# Patient Record
Sex: MALE | Race: BLACK OR AFRICAN AMERICAN | NOT HISPANIC OR LATINO | Employment: OTHER | URBAN - NONMETROPOLITAN AREA
[De-identification: names, ages, dates, MRNs, and addresses within clinical notes are randomized per-mention and may not be internally consistent; named-entity substitution may affect disease eponyms.]

---

## 2024-02-09 PROBLEM — Z95.0 PACEMAKER: Status: ACTIVE | Noted: 2024-02-09

## 2024-02-09 PROBLEM — I27.20 PULMONARY HYPERTENSION (MULTI): Status: ACTIVE | Noted: 2024-02-09

## 2024-02-09 PROBLEM — I10 HYPERTENSION: Status: ACTIVE | Noted: 2024-02-09

## 2024-02-09 PROBLEM — D86.85 CARDIAC SARCOIDOSIS (HHS-HCC): Status: ACTIVE | Noted: 2024-02-09

## 2024-02-09 PROBLEM — I49.5 SICK SINUS SYNDROME (MULTI): Status: ACTIVE | Noted: 2024-02-09

## 2024-02-09 PROBLEM — D86.9 SARCOIDOSIS: Status: ACTIVE | Noted: 2024-02-09

## 2024-02-09 PROBLEM — I48.20 CHRONIC ATRIAL FIBRILLATION (MULTI): Status: ACTIVE | Noted: 2024-02-09

## 2024-02-09 PROBLEM — R06.02 SHORTNESS OF BREATH: Status: ACTIVE | Noted: 2024-02-09

## 2024-02-09 RX ORDER — LISINOPRIL 20 MG/1
1 TABLET ORAL DAILY
COMMUNITY
Start: 2021-10-03

## 2024-02-09 RX ORDER — APIXABAN 5 MG/1
5 TABLET, FILM COATED ORAL 2 TIMES DAILY
COMMUNITY
Start: 2022-01-01

## 2024-02-09 RX ORDER — METOPROLOL SUCCINATE 50 MG/1
1 TABLET, EXTENDED RELEASE ORAL DAILY
COMMUNITY
Start: 2021-01-08

## 2024-02-09 RX ORDER — HYDROCHLOROTHIAZIDE 25 MG/1
1 TABLET ORAL DAILY
COMMUNITY
Start: 2021-12-07

## 2024-04-23 PROCEDURE — 93294 REM INTERROG EVL PM/LDLS PM: CPT | Performed by: INTERNAL MEDICINE

## 2024-05-08 ENCOUNTER — OFFICE VISIT (OUTPATIENT)
Dept: CARDIOLOGY | Facility: CLINIC | Age: 74
End: 2024-05-08
Payer: MEDICARE

## 2024-05-08 VITALS
HEIGHT: 71 IN | WEIGHT: 190 LBS | HEART RATE: 62 BPM | DIASTOLIC BLOOD PRESSURE: 72 MMHG | SYSTOLIC BLOOD PRESSURE: 130 MMHG | BODY MASS INDEX: 26.6 KG/M2

## 2024-05-08 DIAGNOSIS — Z95.0 PACEMAKER: ICD-10-CM

## 2024-05-08 DIAGNOSIS — D86.85 CARDIAC SARCOIDOSIS (HHS-HCC): ICD-10-CM

## 2024-05-08 DIAGNOSIS — I10 PRIMARY HYPERTENSION: ICD-10-CM

## 2024-05-08 DIAGNOSIS — Z78.9 NEVER SMOKED TOBACCO: ICD-10-CM

## 2024-05-08 DIAGNOSIS — I27.20 PULMONARY HYPERTENSION (MULTI): ICD-10-CM

## 2024-05-08 DIAGNOSIS — I48.20 CHRONIC ATRIAL FIBRILLATION (MULTI): Primary | ICD-10-CM

## 2024-05-08 DIAGNOSIS — I49.5 SICK SINUS SYNDROME (MULTI): ICD-10-CM

## 2024-05-08 DIAGNOSIS — R06.02 SHORTNESS OF BREATH: ICD-10-CM

## 2024-05-08 DIAGNOSIS — D86.9 SARCOIDOSIS: ICD-10-CM

## 2024-05-08 PROCEDURE — 3075F SYST BP GE 130 - 139MM HG: CPT | Performed by: INTERNAL MEDICINE

## 2024-05-08 PROCEDURE — 3078F DIAST BP <80 MM HG: CPT | Performed by: INTERNAL MEDICINE

## 2024-05-08 PROCEDURE — 1159F MED LIST DOCD IN RCRD: CPT | Performed by: INTERNAL MEDICINE

## 2024-05-08 PROCEDURE — 1160F RVW MEDS BY RX/DR IN RCRD: CPT | Performed by: INTERNAL MEDICINE

## 2024-05-08 PROCEDURE — 99214 OFFICE O/P EST MOD 30 MIN: CPT | Performed by: INTERNAL MEDICINE

## 2024-05-08 PROCEDURE — 1036F TOBACCO NON-USER: CPT | Performed by: INTERNAL MEDICINE

## 2024-05-08 ASSESSMENT — ENCOUNTER SYMPTOMS: SHORTNESS OF BREATH: 1

## 2024-05-08 NOTE — PATIENT INSTRUCTIONS
Please bring all medicines, vitamins, and herbal supplements with you when you come to the office.    Prescriptions will not be filled unless you are compliant with your follow up appointments or have a follow up appointment scheduled as per instruction of your physician. Refills should be requested at the time of your visit.     BMI was above normal measurement. Current weight: 86.2 kg (190 lb)  Weight change since last visit (-) denotes wt loss 10 lbs   Weight loss needed to achieve BMI 25: 11.1 Lbs  Weight loss needed to achieve BMI 30: -24.6 Lbs  Provided instructions on dietary changes  Advised to Increase physical activity.

## 2024-05-08 NOTE — LETTER
May 8, 2024     Mustapha Graves DO  [address] [facility]  [place] [address]    Patient: Miguel Garces   YOB: 1950   Date of Visit: 5/8/2024       Dear Dr. Mustapha Graves, :    Thank you for referring Miguel Garces to me for evaluation. Below are my notes for this consultation.  If you have questions, please do not hesitate to call me. I look forward to following your patient along with you.       Sincerely,     Olena Schulz MD      CC: No Recipients  ______________________________________________________________________________________    Subjective   Miguel Garces is a 73 y.o. male       Chief Complaint    Follow-up          HPI   Patient is here for follow-up continue management for chronic permanent atrial fibrillation, sick sinus syndrome with permanent pacemaker implantation.  We suspect that he may have a degree of cardiac sarcoidosis based on the development of atrial fibrillation and bradycardia arrhythmia.  He underwent pacemaker implantation.  He has not had any ventricular arrhythmia.  He denies any chest pain, palpitation, lightheadedness, dizziness or syncope.  Recent lab was done through his PCP    ASSESSMENT:      1. chronic atrial fibrillation, with sick sinus syndrome. On long-term anticoagulation tolerating that well  2. Minor symptoms of shortness of breath related to pulmonary sarcoidosis. He had a previous echo and pulmonary function test several years ago.  He will follow-up with the pulmonary service  3. Sick sinus syndrome with permanent pacemaker implantation. With good pacemaker function  4. The patient had significant bradycardia arrhythmia and based on that he is suspected to have possibly a cardiac sarcoidosis due to the development of profound bradycardia and atrial fibrillation. No ventricular tachyarrhythmia were noted.  5. Long-term anticoagulation with Eliquis.  6. Hypertension, controlled  7. Mildly  "overweight  8. Pulmonary hypertension due to lung disease     RECOMMENDATION:      1. The patient was advised to continue present medical therapy  2. We discussed workup for cardiac sarcoidosis. Unfortunately, the patient have a pacemaker and he may not be a good candidate for MRI. At this point of time, considering lack of ventricular tachyarrhythmia and the ability to monitor for that through his device, we elected to continue with observant approach.  3. The rationale behind long-term anticoagulation reviewed with him at length. He understood and agreed.  4. Patient was counseled regarding losing weight, exercise and dietary modification   5.  I reviewed his previous labs and device check  6. I will see him n 9 months and follow-up  Review of Systems   Respiratory:  Positive for shortness of breath.    All other systems reviewed and are negative.           Vitals:    05/08/24 1343   BP: 130/72   BP Location: Right arm   Patient Position: Sitting   Pulse: 62   Weight: 86.2 kg (190 lb)   Height: 1.803 m (5' 11\")        Objective   Physical Exam  Constitutional:       Appearance: Normal appearance.   HENT:      Nose: Nose normal.   Neck:      Vascular: No carotid bruit.   Cardiovascular:      Rate and Rhythm: Normal rate.      Pulses: Normal pulses.      Heart sounds: Normal heart sounds.   Pulmonary:      Effort: Pulmonary effort is normal.   Abdominal:      General: Bowel sounds are normal.      Palpations: Abdomen is soft.   Musculoskeletal:         General: Normal range of motion.      Cervical back: Normal range of motion.      Right lower leg: No edema.      Left lower leg: No edema.   Skin:     General: Skin is warm and dry.   Neurological:      General: No focal deficit present.      Mental Status: He is alert.   Psychiatric:         Mood and Affect: Mood normal.         Behavior: Behavior normal.         Thought Content: Thought content normal.         Judgment: Judgment normal. "         Allergies  Spironolactone     Current Medications    Current Outpatient Medications:   •  Eliquis 5 mg tablet, Take 1 tablet (5 mg) by mouth 2 times a day., Disp: , Rfl:   •  hydroCHLOROthiazide (HYDRODiuril) 25 mg tablet, Take 1 tablet (25 mg) by mouth once daily., Disp: , Rfl:   •  lisinopril 20 mg tablet, Take 1 tablet (20 mg) by mouth once daily., Disp: , Rfl:   •  metoprolol succinate XL (Toprol-XL) 50 mg 24 hr tablet, Take 1 tablet (50 mg) by mouth once daily., Disp: , Rfl:                      Assessment/Plan   1. Chronic atrial fibrillation (Multi)  Follow Up In Cardiology      2. Cardiac sarcoidosis ([facility])        3. Primary hypertension        4. Pacemaker        5. Sick sinus syndrome (Multi)        6. Pulmonary hypertension (Multi)        7. Shortness of breath        8. Sarcoidosis        9. Never smoked tobacco                 Scribe Attestation  By signing my name below, Lauren LOPEZ LPN, Scribe   attest that this documentation has been prepared under the direction and in the presence of Olena Schulz MD.     Provider Attestation - Scribe documentation    All medical record entries made by the Scribe were at my direction and personally dictated by me. I have reviewed the chart and agree that the record accurately reflects my personal performance of the history, physical exam, discussion and plan.

## 2024-05-08 NOTE — PROGRESS NOTES
Subjective   Miguel Garces is a 73 y.o. male       Chief Complaint    Follow-up          HPI   Patient is here for follow-up continue management for chronic permanent atrial fibrillation, sick sinus syndrome with permanent pacemaker implantation.  We suspect that he may have a degree of cardiac sarcoidosis based on the development of atrial fibrillation and bradycardia arrhythmia.  He underwent pacemaker implantation.  He has not had any ventricular arrhythmia.  He denies any chest pain, palpitation, lightheadedness, dizziness or syncope.  Recent lab was done through his PCP    ASSESSMENT:      1. chronic atrial fibrillation, with sick sinus syndrome. On long-term anticoagulation tolerating that well  2. Minor symptoms of shortness of breath related to pulmonary sarcoidosis. He had a previous echo and pulmonary function test several years ago.  He will follow-up with the pulmonary service  3. Sick sinus syndrome with permanent pacemaker implantation. With good pacemaker function  4. The patient had significant bradycardia arrhythmia and based on that he is suspected to have possibly a cardiac sarcoidosis due to the development of profound bradycardia and atrial fibrillation. No ventricular tachyarrhythmia were noted.  5. Long-term anticoagulation with Eliquis.  6. Hypertension, controlled  7. Mildly overweight  8. Pulmonary hypertension due to lung disease     RECOMMENDATION:      1. The patient was advised to continue present medical therapy  2. We discussed workup for cardiac sarcoidosis. Unfortunately, the patient have a pacemaker and he may not be a good candidate for MRI. At this point of time, considering lack of ventricular tachyarrhythmia and the ability to monitor for that through his device, we elected to continue with observant approach.  3. The rationale behind long-term anticoagulation reviewed with him at length. He understood and agreed.  4. Patient was counseled regarding losing weight, exercise  "and dietary modification   5.  I reviewed his previous labs and device check  6. I will see him n 9 months and follow-up  Review of Systems   Respiratory:  Positive for shortness of breath.    All other systems reviewed and are negative.           Vitals:    05/08/24 1343   BP: 130/72   BP Location: Right arm   Patient Position: Sitting   Pulse: 62   Weight: 86.2 kg (190 lb)   Height: 1.803 m (5' 11\")        Objective   Physical Exam  Constitutional:       Appearance: Normal appearance.   HENT:      Nose: Nose normal.   Neck:      Vascular: No carotid bruit.   Cardiovascular:      Rate and Rhythm: Normal rate.      Pulses: Normal pulses.      Heart sounds: Normal heart sounds.   Pulmonary:      Effort: Pulmonary effort is normal.   Abdominal:      General: Bowel sounds are normal.      Palpations: Abdomen is soft.   Musculoskeletal:         General: Normal range of motion.      Cervical back: Normal range of motion.      Right lower leg: No edema.      Left lower leg: No edema.   Skin:     General: Skin is warm and dry.   Neurological:      General: No focal deficit present.      Mental Status: He is alert.   Psychiatric:         Mood and Affect: Mood normal.         Behavior: Behavior normal.         Thought Content: Thought content normal.         Judgment: Judgment normal.         Allergies  Spironolactone     Current Medications    Current Outpatient Medications:     Eliquis 5 mg tablet, Take 1 tablet (5 mg) by mouth 2 times a day., Disp: , Rfl:     hydroCHLOROthiazide (HYDRODiuril) 25 mg tablet, Take 1 tablet (25 mg) by mouth once daily., Disp: , Rfl:     lisinopril 20 mg tablet, Take 1 tablet (20 mg) by mouth once daily., Disp: , Rfl:     metoprolol succinate XL (Toprol-XL) 50 mg 24 hr tablet, Take 1 tablet (50 mg) by mouth once daily., Disp: , Rfl:                      Assessment/Plan   1. Chronic atrial fibrillation (Multi)  Follow Up In Cardiology      2. Cardiac sarcoidosis ([facility]-HCC)        3. Primary " hypertension        4. Pacemaker        5. Sick sinus syndrome (Multi)        6. Pulmonary hypertension (Multi)        7. Shortness of breath        8. Sarcoidosis        9. Never smoked tobacco                 Scribe Attestation  By signing my name below, Lauren LOPEZ LPN, Scribe   attest that this documentation has been prepared under the direction and in the presence of Olena Schulz MD.     Provider Attestation - Scribe documentation    All medical record entries made by the Scribe were at my direction and personally dictated by me. I have reviewed the chart and agree that the record accurately reflects my personal performance of the history, physical exam, discussion and plan.

## 2024-09-04 ENCOUNTER — TELEPHONE (OUTPATIENT)
Dept: CARDIOLOGY | Facility: CLINIC | Age: 74
End: 2024-09-04
Payer: MEDICARE

## 2024-09-04 DIAGNOSIS — I48.92 ATRIAL FIBRILLATION AND FLUTTER (MULTI): ICD-10-CM

## 2024-09-04 DIAGNOSIS — Z95.0 PACEMAKER: ICD-10-CM

## 2024-09-04 DIAGNOSIS — I48.91 ATRIAL FIBRILLATION AND FLUTTER (MULTI): ICD-10-CM

## 2024-09-04 DIAGNOSIS — I49.5 SICK SINUS SYNDROME (MULTI): ICD-10-CM

## 2024-11-29 DIAGNOSIS — I10 PRIMARY HYPERTENSION: ICD-10-CM

## 2024-11-29 RX ORDER — HYDROCHLOROTHIAZIDE 25 MG/1
25 TABLET ORAL DAILY
Qty: 90 TABLET | Refills: 3 | Status: SHIPPED | OUTPATIENT
Start: 2024-11-29 | End: 2025-11-29

## 2025-02-11 ENCOUNTER — APPOINTMENT (OUTPATIENT)
Dept: CARDIOLOGY | Facility: CLINIC | Age: 75
End: 2025-02-11
Payer: MEDICARE

## 2025-02-11 VITALS
WEIGHT: 198 LBS | BODY MASS INDEX: 27.72 KG/M2 | SYSTOLIC BLOOD PRESSURE: 138 MMHG | HEIGHT: 71 IN | HEART RATE: 72 BPM | DIASTOLIC BLOOD PRESSURE: 72 MMHG

## 2025-02-11 DIAGNOSIS — D86.85 CARDIAC SARCOIDOSIS: ICD-10-CM

## 2025-02-11 DIAGNOSIS — R06.02 SHORTNESS OF BREATH: ICD-10-CM

## 2025-02-11 DIAGNOSIS — D86.9 SARCOIDOSIS: ICD-10-CM

## 2025-02-11 DIAGNOSIS — Z95.0 PACEMAKER: ICD-10-CM

## 2025-02-11 DIAGNOSIS — Z78.9 NEVER SMOKED TOBACCO: ICD-10-CM

## 2025-02-11 DIAGNOSIS — I49.5 SICK SINUS SYNDROME (MULTI): ICD-10-CM

## 2025-02-11 DIAGNOSIS — I48.20 CHRONIC ATRIAL FIBRILLATION (MULTI): Primary | ICD-10-CM

## 2025-02-11 DIAGNOSIS — I27.20 PULMONARY HYPERTENSION (MULTI): ICD-10-CM

## 2025-02-11 PROCEDURE — 1159F MED LIST DOCD IN RCRD: CPT | Performed by: INTERNAL MEDICINE

## 2025-02-11 PROCEDURE — 99214 OFFICE O/P EST MOD 30 MIN: CPT | Performed by: INTERNAL MEDICINE

## 2025-02-11 PROCEDURE — G2211 COMPLEX E/M VISIT ADD ON: HCPCS | Performed by: INTERNAL MEDICINE

## 2025-02-11 PROCEDURE — 1036F TOBACCO NON-USER: CPT | Performed by: INTERNAL MEDICINE

## 2025-02-11 PROCEDURE — 3008F BODY MASS INDEX DOCD: CPT | Performed by: INTERNAL MEDICINE

## 2025-02-11 PROCEDURE — 3078F DIAST BP <80 MM HG: CPT | Performed by: INTERNAL MEDICINE

## 2025-02-11 PROCEDURE — 1160F RVW MEDS BY RX/DR IN RCRD: CPT | Performed by: INTERNAL MEDICINE

## 2025-02-11 PROCEDURE — 3075F SYST BP GE 130 - 139MM HG: CPT | Performed by: INTERNAL MEDICINE

## 2025-02-11 ASSESSMENT — ENCOUNTER SYMPTOMS: SHORTNESS OF BREATH: 1

## 2025-02-11 NOTE — LETTER
February 11, 2025     Mustapha Graves DO  [address] [facility]  [place] [address]    Patient: Miguel Garces   YOB: 1950   Date of Visit: 2/11/2025       Dear Dr. Mustapha Graves, :    Thank you for referring Miguel Garces to me for evaluation. Below are my notes for this consultation.  If you have questions, please do not hesitate to call me. I look forward to following your patient along with you.       Sincerely,     Olena Schulz MD      CC: No Recipients  ______________________________________________________________________________________    Subjective   Miguel Garces is a 74 y.o. male       Chief Complaint    Follow-up          HPI        Patient is here for follow-up and management of chronic atrial fibrillation, with sick sinus syndrome, hypertension and pulmonary hypertension due to lung disease.  Since last time I saw him he described functional class II shortness of breath.  He denies chest pain, palpitation, lightheadedness, dizziness or syncope.  His recent device check and lab work all noted and reviewed with him.    ASSESSMENT:      1. chronic atrial fibrillation, with component of sick sinus syndrome and AV joslyn disease . On long-term anticoagulation tolerating that well  2. Minor symptoms of shortness of breath related to pulmonary sarcoidosis. He had a previous echo and pulmonary function test several years ago.  He will follow-up with the pulmonary service  3. Sick sinus syndrome with permanent pacemaker implantation. With good pacemaker function  4. The patient had significant bradycardia arrhythmia and based on that he is suspected to have possibly a cardiac sarcoidosis due to the development of profound bradycardia and atrial fibrillation. No ventricular tachyarrhythmia were noted.  5. Long-term anticoagulation with Eliquis.  6. Hypertension, controlled  7. Mildly overweight with BMI of 27  8.  Moderate pulmonary hypertension  "due to lung disease/sarcoidosis     RECOMMENDATION:      1. The patient was advised to continue present medical therapy  2. We discussed workup for cardiac sarcoidosis. Unfortunately, the patient have a pacemaker and he may not be a good candidate for MRI. At this point of time, considering lack of ventricular tachyarrhythmia and the ability to monitor for that through his device, we elected to continue with observant approach.  3. The rationale behind long-term anticoagulation reviewed with him at length. He understood and agreed.  4. Patient was counseled regarding losing weight, exercise and dietary modification   5.  I reviewed his previous labs and device check  6. I will see him n 9 months and follow-up  Review of Systems   Respiratory:  Positive for shortness of breath.    All other systems reviewed and are negative.           Vitals:    02/11/25 1403   BP: 138/72   BP Location: Left arm   Patient Position: Sitting   Pulse: 72   Weight: 89.8 kg (198 lb)   Height: 1.803 m (5' 11\")        Objective   Physical Exam  Constitutional:       Appearance: Normal appearance.   HENT:      Nose: Nose normal.   Neck:      Vascular: No carotid bruit.   Cardiovascular:      Rate and Rhythm: Normal rate.      Pulses: Normal pulses.      Heart sounds: Normal heart sounds.   Pulmonary:      Effort: Pulmonary effort is normal.   Abdominal:      General: Bowel sounds are normal.      Palpations: Abdomen is soft.   Musculoskeletal:         General: Normal range of motion.      Cervical back: Normal range of motion.      Right lower leg: No edema.      Left lower leg: No edema.   Skin:     General: Skin is warm and dry.   Neurological:      General: No focal deficit present.      Mental Status: He is alert.   Psychiatric:         Mood and Affect: Mood normal.         Behavior: Behavior normal.         Thought Content: Thought content normal.         Judgment: Judgment normal.         Allergies  Spironolactone     Current " Medications    Current Outpatient Medications:   •  Eliquis 5 mg tablet, Take 1 tablet (5 mg) by mouth 2 times a day., Disp: , Rfl:   •  hydroCHLOROthiazide (HYDRODiuril) 25 mg tablet, Take 1 tablet (25 mg) by mouth once daily., Disp: 90 tablet, Rfl: 3  •  lisinopril 20 mg tablet, Take 1 tablet (20 mg) by mouth once daily., Disp: , Rfl:   •  metoprolol succinate XL (Toprol-XL) 50 mg 24 hr tablet, Take 1 tablet (50 mg) by mouth once daily., Disp: , Rfl:                      Assessment/Plan   1. Chronic atrial fibrillation (Multi)  Follow Up In Cardiology      2. Sick sinus syndrome (Multi)  Follow Up In Cardiology      3. Pulmonary hypertension (Multi)        4. Pacemaker        5. Cardiac sarcoidosis        6. BMI 26.0-26.9,adult        7. Sarcoidosis        8. Shortness of breath        9. Never smoked tobacco                 Scribe Attestation  By signing my name below, Lauren LOPEZ LPN, Scribe   attest that this documentation has been prepared under the direction and in the presence of Olena Schulz MD.     Provider Attestation - Scribe documentation    All medical record entries made by the Scribe were at my direction and personally dictated by me. I have reviewed the chart and agree that the record accurately reflects my personal performance of the history, physical exam, discussion and plan.

## 2025-02-11 NOTE — PROGRESS NOTES
Subjective   Miguel Garces is a 74 y.o. male       Chief Complaint    Follow-up          HPI        Patient is here for follow-up and management of chronic atrial fibrillation, with sick sinus syndrome, hypertension and pulmonary hypertension due to lung disease.  Since last time I saw him he described functional class II shortness of breath.  He denies chest pain, palpitation, lightheadedness, dizziness or syncope.  His recent device check and lab work all noted and reviewed with him.    ASSESSMENT:      1. chronic atrial fibrillation, with component of sick sinus syndrome and AV joslyn disease . On long-term anticoagulation tolerating that well  2. Minor symptoms of shortness of breath related to pulmonary sarcoidosis. He had a previous echo and pulmonary function test several years ago.  He will follow-up with the pulmonary service  3. Sick sinus syndrome with permanent pacemaker implantation. With good pacemaker function  4. The patient had significant bradycardia arrhythmia and based on that he is suspected to have possibly a cardiac sarcoidosis due to the development of profound bradycardia and atrial fibrillation. No ventricular tachyarrhythmia were noted.  5. Long-term anticoagulation with Eliquis.  6. Hypertension, controlled  7. Mildly overweight with BMI of 27  8.  Moderate pulmonary hypertension due to lung disease/sarcoidosis     RECOMMENDATION:      1. The patient was advised to continue present medical therapy  2. We discussed workup for cardiac sarcoidosis. Unfortunately, the patient have a pacemaker and he may not be a good candidate for MRI. At this point of time, considering lack of ventricular tachyarrhythmia and the ability to monitor for that through his device, we elected to continue with observant approach.  3. The rationale behind long-term anticoagulation reviewed with him at length. He understood and agreed.  4. Patient was counseled regarding losing weight, exercise and dietary  "modification   5.  I reviewed his previous labs and device check  6. I will see him n 9 months and follow-up  Review of Systems   Respiratory:  Positive for shortness of breath.    All other systems reviewed and are negative.           Vitals:    02/11/25 1403   BP: 138/72   BP Location: Left arm   Patient Position: Sitting   Pulse: 72   Weight: 89.8 kg (198 lb)   Height: 1.803 m (5' 11\")        Objective   Physical Exam  Constitutional:       Appearance: Normal appearance.   HENT:      Nose: Nose normal.   Neck:      Vascular: No carotid bruit.   Cardiovascular:      Rate and Rhythm: Normal rate.      Pulses: Normal pulses.      Heart sounds: Normal heart sounds.   Pulmonary:      Effort: Pulmonary effort is normal.   Abdominal:      General: Bowel sounds are normal.      Palpations: Abdomen is soft.   Musculoskeletal:         General: Normal range of motion.      Cervical back: Normal range of motion.      Right lower leg: No edema.      Left lower leg: No edema.   Skin:     General: Skin is warm and dry.   Neurological:      General: No focal deficit present.      Mental Status: He is alert.   Psychiatric:         Mood and Affect: Mood normal.         Behavior: Behavior normal.         Thought Content: Thought content normal.         Judgment: Judgment normal.         Allergies  Spironolactone     Current Medications    Current Outpatient Medications:     Eliquis 5 mg tablet, Take 1 tablet (5 mg) by mouth 2 times a day., Disp: , Rfl:     hydroCHLOROthiazide (HYDRODiuril) 25 mg tablet, Take 1 tablet (25 mg) by mouth once daily., Disp: 90 tablet, Rfl: 3    lisinopril 20 mg tablet, Take 1 tablet (20 mg) by mouth once daily., Disp: , Rfl:     metoprolol succinate XL (Toprol-XL) 50 mg 24 hr tablet, Take 1 tablet (50 mg) by mouth once daily., Disp: , Rfl:                      Assessment/Plan   1. Chronic atrial fibrillation (Multi)  Follow Up In Cardiology      2. Sick sinus syndrome (Multi)  Follow Up In Cardiology    "   3. Pulmonary hypertension (Multi)        4. Pacemaker        5. Cardiac sarcoidosis        6. BMI 26.0-26.9,adult        7. Sarcoidosis        8. Shortness of breath        9. Never smoked tobacco                 Scribe Attestation  By signing my name below, Lauren LOPEZ LPN, Scribe   attest that this documentation has been prepared under the direction and in the presence of Olena Schulz MD.     Provider Attestation - Scribe documentation    All medical record entries made by the Scribe were at my direction and personally dictated by me. I have reviewed the chart and agree that the record accurately reflects my personal performance of the history, physical exam, discussion and plan.

## 2025-02-11 NOTE — PATIENT INSTRUCTIONS
Please bring all medicines, vitamins, and herbal supplements with you when you come to the office.    Prescriptions will not be filled unless you are compliant with your follow up appointments or have a follow up appointment scheduled as per instruction of your physician. Refills should be requested at the time of your visit.     BMI was above normal measurement. Current weight: 89.8 kg (198 lb)  Weight change since last visit (-) denotes wt loss 8 lbs   Weight loss needed to achieve BMI 25: 19.1 Lbs  Weight loss needed to achieve BMI 30: -16.6 Lbs  Provided instructions on dietary changes.

## 2025-12-05 ENCOUNTER — APPOINTMENT (OUTPATIENT)
Dept: CARDIOLOGY | Facility: CLINIC | Age: 75
End: 2025-12-05
Payer: MEDICARE